# Patient Record
Sex: MALE | Race: WHITE
[De-identification: names, ages, dates, MRNs, and addresses within clinical notes are randomized per-mention and may not be internally consistent; named-entity substitution may affect disease eponyms.]

---

## 2017-12-19 ENCOUNTER — HOSPITAL ENCOUNTER (OUTPATIENT)
Dept: HOSPITAL 92 - LABBT | Age: 62
Discharge: HOME | End: 2017-12-19
Attending: NEUROLOGICAL SURGERY
Payer: COMMERCIAL

## 2017-12-19 DIAGNOSIS — Z01.812: Primary | ICD-10-CM

## 2017-12-19 DIAGNOSIS — M47.12: ICD-10-CM

## 2017-12-19 LAB
APTT PPP: 31 SEC (ref 22.9–36.1)
HCT VFR BLD CALC: 51.8 % (ref 42–52)
PROTHROMBIN TIME: 13.9 SEC (ref 12–14.7)
RBC # BLD AUTO: 5.4 MILL/UL (ref 4.7–6.1)
WBC # BLD AUTO: 7.9 THOU/UL (ref 4.8–10.8)

## 2017-12-19 PROCEDURE — 85610 PROTHROMBIN TIME: CPT

## 2017-12-19 PROCEDURE — 85730 THROMBOPLASTIN TIME PARTIAL: CPT

## 2017-12-19 PROCEDURE — 85027 COMPLETE CBC AUTOMATED: CPT

## 2017-12-27 ENCOUNTER — HOSPITAL ENCOUNTER (OUTPATIENT)
Dept: HOSPITAL 92 - ERS | Age: 62
Setting detail: OBSERVATION
LOS: 4 days | Discharge: HOME | End: 2017-12-31
Attending: INTERNAL MEDICINE | Admitting: INTERNAL MEDICINE
Payer: COMMERCIAL

## 2017-12-27 VITALS — BODY MASS INDEX: 24.7 KG/M2

## 2017-12-27 DIAGNOSIS — Z79.899: ICD-10-CM

## 2017-12-27 DIAGNOSIS — I49.9: ICD-10-CM

## 2017-12-27 DIAGNOSIS — I82.4Z2: Primary | ICD-10-CM

## 2017-12-27 LAB
ALBUMIN SERPL BCG-MCNC: 4.5 G/DL (ref 3.4–4.8)
ALP SERPL-CCNC: 39 U/L (ref 40–150)
ALT SERPL W P-5'-P-CCNC: 24 U/L (ref 8–55)
ANION GAP SERPL CALC-SCNC: 14 MMOL/L (ref 10–20)
APTT PPP: 32.3 SEC (ref 22.9–36.1)
AST SERPL-CCNC: 24 U/L (ref 5–34)
BASOPHILS # BLD AUTO: 0.1 THOU/UL (ref 0–0.2)
BASOPHILS NFR BLD AUTO: 0.5 % (ref 0–1)
BILIRUB SERPL-MCNC: 0.6 MG/DL (ref 0.2–1.2)
BUN SERPL-MCNC: 19 MG/DL (ref 8.4–25.7)
CALCIUM SERPL-MCNC: 10.6 MG/DL (ref 7.8–10.44)
CHLORIDE SERPL-SCNC: 98 MMOL/L (ref 98–107)
CK MB SERPL-MCNC: 3.8 NG/ML (ref 0–6.6)
CO2 SERPL-SCNC: 27 MMOL/L (ref 23–31)
CREAT CL PREDICTED SERPL C-G-VRATE: 0 ML/MIN (ref 70–130)
EOSINOPHIL # BLD AUTO: 0.2 THOU/UL (ref 0–0.7)
EOSINOPHIL NFR BLD AUTO: 1.5 % (ref 0–10)
GLOBULIN SER CALC-MCNC: 3.6 G/DL (ref 2.4–3.5)
GLUCOSE SERPL-MCNC: 98 MG/DL (ref 80–115)
HGB BLD-MCNC: 16 G/DL (ref 14–18)
INR PPP: 1.1
LYMPHOCYTES # BLD: 1.3 THOU/UL (ref 1.2–3.4)
LYMPHOCYTES NFR BLD AUTO: 9.5 % (ref 21–51)
MCH RBC QN AUTO: 31.8 PG (ref 27–31)
MCV RBC AUTO: 96.5 FL (ref 80–94)
MONOCYTES # BLD AUTO: 1.1 THOU/UL (ref 0.11–0.59)
MONOCYTES NFR BLD AUTO: 7.7 % (ref 0–10)
NEUTROPHILS # BLD AUTO: 11.1 THOU/UL (ref 1.4–6.5)
NEUTROPHILS NFR BLD AUTO: 80.8 % (ref 42–75)
PLATELET # BLD AUTO: 256 THOU/UL (ref 130–400)
POTASSIUM SERPL-SCNC: 4.6 MMOL/L (ref 3.5–5.1)
PROTHROMBIN TIME: 14.8 SEC (ref 12–14.7)
RBC # BLD AUTO: 5.02 MILL/UL (ref 4.7–6.1)
SODIUM SERPL-SCNC: 134 MMOL/L (ref 136–145)
TROPONIN I SERPL DL<=0.01 NG/ML-MCNC: 0.02 NG/ML (ref ?–0.03)
WBC # BLD AUTO: 13.8 THOU/UL (ref 4.8–10.8)

## 2017-12-27 PROCEDURE — 96375 TX/PRO/DX INJ NEW DRUG ADDON: CPT

## 2017-12-27 PROCEDURE — 81240 F2 GENE: CPT

## 2017-12-27 PROCEDURE — 85303 CLOT INHIBIT PROT C ACTIVITY: CPT

## 2017-12-27 PROCEDURE — 85018 HEMOGLOBIN: CPT

## 2017-12-27 PROCEDURE — 85305 CLOT INHIBIT PROT S TOTAL: CPT

## 2017-12-27 PROCEDURE — 85300 ANTITHROMBIN III ACTIVITY: CPT

## 2017-12-27 PROCEDURE — 93005 ELECTROCARDIOGRAM TRACING: CPT

## 2017-12-27 PROCEDURE — G0009 ADMIN PNEUMOCOCCAL VACCINE: HCPCS

## 2017-12-27 PROCEDURE — 85240 CLOT FACTOR VIII AHG 1 STAGE: CPT

## 2017-12-27 PROCEDURE — 96372 THER/PROPH/DIAG INJ SC/IM: CPT

## 2017-12-27 PROCEDURE — G0378 HOSPITAL OBSERVATION PER HR: HCPCS

## 2017-12-27 PROCEDURE — 82565 ASSAY OF CREATININE: CPT

## 2017-12-27 PROCEDURE — 71010: CPT

## 2017-12-27 PROCEDURE — 85598 HEXAGNAL PHOSPH PLTLT NEUTRL: CPT

## 2017-12-27 PROCEDURE — 96376 TX/PRO/DX INJ SAME DRUG ADON: CPT

## 2017-12-27 PROCEDURE — 90732 PPSV23 VACC 2 YRS+ SUBQ/IM: CPT

## 2017-12-27 PROCEDURE — 85730 THROMBOPLASTIN TIME PARTIAL: CPT

## 2017-12-27 PROCEDURE — 80053 COMPREHEN METABOLIC PANEL: CPT

## 2017-12-27 PROCEDURE — 71275 CT ANGIOGRAPHY CHEST: CPT

## 2017-12-27 PROCEDURE — 90471 IMMUNIZATION ADMIN: CPT

## 2017-12-27 PROCEDURE — 96361 HYDRATE IV INFUSION ADD-ON: CPT

## 2017-12-27 PROCEDURE — 85379 FIBRIN DEGRADATION QUANT: CPT

## 2017-12-27 PROCEDURE — 85610 PROTHROMBIN TIME: CPT

## 2017-12-27 PROCEDURE — 82553 CREATINE MB FRACTION: CPT

## 2017-12-27 PROCEDURE — 96374 THER/PROPH/DIAG INJ IV PUSH: CPT

## 2017-12-27 PROCEDURE — 85014 HEMATOCRIT: CPT

## 2017-12-27 PROCEDURE — 85025 COMPLETE CBC W/AUTO DIFF WBC: CPT

## 2017-12-27 PROCEDURE — 85049 AUTOMATED PLATELET COUNT: CPT

## 2017-12-27 PROCEDURE — 84484 ASSAY OF TROPONIN QUANT: CPT

## 2017-12-27 PROCEDURE — 85307 ASSAY ACTIVATED PROTEIN C: CPT

## 2017-12-27 PROCEDURE — 83090 ASSAY OF HOMOCYSTEINE: CPT

## 2017-12-27 PROCEDURE — 36415 COLL VENOUS BLD VENIPUNCTURE: CPT

## 2017-12-27 RX ADMIN — HYDROCODONE BITARTRATE AND ACETAMINOPHEN PRN TAB: 5; 325 TABLET ORAL at 20:18

## 2017-12-27 NOTE — CT
CT ARTERIOGRAM CHEST WITH IV CONTRAST AND 3D MIP IMAGIN17

 

HISTORY: 

Dyspnea. Chest pain.

 

FINDINGS:  

There is good contrast opacification of the pulmonary arteries and thoracic aorta with normal branchi
ng of the great vessels. Very mild arterial calcifications apparent. There is no  pleural fluid, pneu
mothorax, or mediastinal adenopathy apparent. 

 

IMPRESSION:  

No CT evidence of pulmonary embolus. 

 

POS: [facility]

## 2017-12-27 NOTE — ULT
EXAM:

LEFT LOWER EXTREMITY VENOUS ULTRASOUND WITH DOPPLER

12/27/17

 

HISTORY: 

Left leg swelling and pain. 

 

COMPARISON:  

None.

 

TECHNIQUE:  

Gray scale, color flow, doppler imaging with spectral waveform analysis performed in the left lower e
xtremity venous system.

 

FINDINGS:  

There is lack of complete compressibility and inadequate flow in the common femoral vein, femoral vei
n, and popliteal vein. There is echogenic material and decreased flow in the profunda femoral vein. T
here appears to be significantly diminished flow in the greater saphenous vein. There is flow in the 
posterior tibial vein. 

 

IMPRESSION:  

Left lower extremity deep vein thrombus. DVT is noted from the common femoral vein to the popliteal v
ein. 

 

POS: [facility]

## 2017-12-27 NOTE — RAD
PORTABLE CHEST:

12/27/17

 

HISTORY: 

Dyspnea. 

 

Heart size and mediastinum are within normal limits. The lungs are clear of infiltrates. There are no
 significant bony findings. 

 

IMPRESSION:  

No active intrathoracic disease. 

 

 

 

POS: [facility]

## 2017-12-28 LAB
APTT PPP: 44 SEC (ref 22.9–36.1)
AT III ACT/NOR PPP CHRO: 93 % ACTIVE (ref 85–130)
D DIMER PPP FEU-MCNC: 1.79 *MCG/ML (ref 0.27–0.43)
FACT VIII ACT/NOR PPP: 291 % ACTIVE (ref 56–157)
INR PPP: 1.2
PROTHROMBIN TIME: 15.4 SEC (ref 12–14.7)

## 2017-12-28 RX ADMIN — HYDROCODONE BITARTRATE AND ACETAMINOPHEN PRN TAB: 5; 325 TABLET ORAL at 11:35

## 2017-12-28 RX ADMIN — HYDROCODONE BITARTRATE AND ACETAMINOPHEN PRN TAB: 5; 325 TABLET ORAL at 19:56

## 2017-12-28 RX ADMIN — HYDROCODONE BITARTRATE AND ACETAMINOPHEN PRN TAB: 5; 325 TABLET ORAL at 07:11

## 2017-12-28 RX ADMIN — HYDROCODONE BITARTRATE AND ACETAMINOPHEN PRN TAB: 5; 325 TABLET ORAL at 23:56

## 2017-12-28 RX ADMIN — HYDROCODONE BITARTRATE AND ACETAMINOPHEN PRN TAB: 5; 325 TABLET ORAL at 15:42

## 2017-12-28 NOTE — PDOC.PN
- Subjective


Encounter Start Date: 12/28/17


Encounter Start Time: 13:00


Subjective: pt seen and examined for LEFT leg DVT


-: pt denies any CP, swelling left leg decresed 





- Objective


MAR Reviewed: Yes


Vital Signs & Weight: 


 Vital Signs (12 hours)











  Temp Pulse Resp BP Pulse Ox


 


 12/28/17 11:37  98.2 F  77  18  134/73  95


 


 12/28/17 07:40  98.5 F  68  16  


 


 12/28/17 07:27  98.8 F  77  18  119/57 L  97








 Weight











Weight                         192 lb 9.6 oz














I&O: 


 











 12/27/17 12/28/17 12/29/17





 06:59 06:59 06:59


 


Intake Total  1292 558


 


Output Total  500 600


 


Balance  792 -42











Result Diagrams: 


 12/27/17 15:17





 12/27/17 15:17


Radiology Reviewed by me: Yes





Phys Exam





- Physical Examination


HEENT: PERRLA, moist MMs, sclera anicteric, TM's clear, oral pharynx no lesions

, 2+ tonsils


Neck: no nodes, no JVD, supple, full ROM


Respiratory: no wheezing, no rales, no rhonchi, wheezing present, clear to 

auscultation bilateral


Cardiovascular: RRR, no significant murmur, no rub, gallop, irregular


Gastrointestinal: soft, non-tender, no distention, positive bowel sounds


Musculoskeletal: edema present


Left Leg is swollen up to thigh with erythema





Dx/Plan


(1) Dvt femoral (deep venous thrombosis)


Code(s): I82.419 - ACUTE EMBOLISM AND THROMBOSIS OF UNSPECIFIED FEMORAL VEIN   

Status: Acute   





(2) HTN (hypertension), benign


Code(s): I10 - ESSENTIAL (PRIMARY) HYPERTENSION   Status: Chronic   





(3) Arrhythmia


Code(s): I49.9 - CARDIAC ARRHYTHMIA, UNSPECIFIED   Status: Chronic   





- Plan


cont current plan of care, DVT proph w/lovenox


1) Continue Lovenox 1mg/kg q 12hrs, will add Coumadin tonight 


-: 2) Hypercoaguable work up sent


-: 3) Continue metoprolol for HTN


-: 4) DVT treatment is Lovenox


-: 5) H/o Arrthymia continue Multaq





* .








Review of Systems





- Review of Systems


Respiratory: negative: Cough, Dry, Shortness of Breath, Hemoptysis, SOB with 

Excertion, Pleuritic Pain, Sputum, Wheezing


Cardiovascular: negative: chest pain, palpitations, orthopnea, paroxysmal 

nocturnal dyspnea, edema, light headedness, other


Gastrointestinal: negative: Nausea, Vomiting, Abdominal Pain, Diarrhea, 

Constipation, Melena, Hematochezia, Other


Genitourinary: negative: Dysuria, Frequency, Incontinence, Hematuria, Retention

, Other


Musculoskeletal: Leg Pain





- Medications/Allergies


Allergies/Adverse Reactions: 


 Allergies











Allergy/AdvReac Type Severity Reaction Status Date / Time


 


albuterol Allergy   Verified 12/27/17 19:12


 


meperidine [From Demerol] Allergy   Verified 12/27/17 19:12











Medications: 


 Current Medications





Hydrocodone Bitart/Acetaminophen (Norco 5/325)  1 tab PO Q4H PRN


   PRN Reason: PAIN SCALE 1-5


Hydrocodone Bitart/Acetaminophen (Norco 5/325)  2 tab PO Q4H PRN


   PRN Reason: PAIN SCALE 6-10


   Last Admin: 12/28/17 11:35 Dose:  2 tab


Dronedarone (Multaq)  400 mg PO BID [facility]


   Last Admin: 12/28/17 08:41 Dose:  400 mg


Dronedarone (Multaq)  400 mg PO BID-[facility]


Enoxaparin Sodium (Lovenox)  90 mg SC 0400,1600 [facility]


   Last Admin: 12/28/17 04:11 Dose:  90 mg


Sodium Chloride (Normal Saline 0.9%)  1,000 mls @ 75 mls/hr IV .[id number] [facility]


   Last Admin: 12/28/17 09:39 Dose:  1,000 mls


Loratadine (Claritin)  10 mg PO HS [facility]


Metoprolol Succinate (Toprol Xl)  25 mg PO QPM [facility]


Metoprolol Tartrate (Lopressor)  25 mg PO QPM [facility]


Morphine Sulfate (Morphine)  4 mg SLOW IVP Q4H PRN


   PRN Reason: Pain


   Last Admin: 12/28/17 02:44 Dose:  4 mg


Sertraline HCl (Zoloft)  50 mg PO DAILY [facility]


   Last Admin: 12/28/17 08:41 Dose:  50 mg


Sertraline HCl (Zoloft)  50 mg PO DAILY [facility]


Sodium Chloride (Flush - Normal Saline)  10 ml IVF PRN PRN


   PRN Reason: Saline Flush


Trazodone HCl (Desyrel)  100 mg PO HS [facility]


Trazodone HCl (Desyrel)  100 mg PO HS [facility]

## 2017-12-28 NOTE — HP
CHIEF COMPLAINT:  Pain in the left leg.

 

HISTORY OF PRESENT ILLNESS:  He is a 62-year-old man with no past medical history.  He has presented 
to the hospital with redness, swelling of left leg with pain and tenderness for the last 2 days.  He 
has been less mobile for the last one week because he is going for fusion back surgery.  He denies an
y fever, any injury, any chest pain, and any short of breath.  In the ED, he was given morphine and Z
ofran for the pain.  When he comes in to the ER, pulse 83, blood pressure 140/59, temperature 98.3, r
espiration rate 18.

 

CURRENT MEDICATIONS:  He takes in home, Multaq 400 mg 2 times daily for arrhythmias, Demerol 50 mg ev
richelle 6 hours, Zoloft 100 mg daily, meloxicam 15 mg daily, trazodone 100 mg daily, metoprolol 20 mg shantelle
ly, Depo-Testosterone 200 mg intramuscular once a week.

 

PERSONAL HISTORY:  Does not smoke, does not drink.  Denied any drug use.

 

PAST MEDICAL HISTORY:  No history of DVT in the past.

 

PAST SURGICAL HISTORY:  No surgical history.

 

REVIEW OF SYSTEMS:  Constitutional:  He denies any fever, any malaise, and any weight loss.  HEENT:  
He denies any ear pain, earache and eye discharge.  Respiration:  He denies any chest pain, any cough
, any of short of breath, and any wheezing.  Cardiovascular:  Denies arrhythmias, any palpitation, an
y chest pain, and any orthopnea.  Abdomen:  Soft.  Denies any nausea, vomiting, diarrhea.  Back:  Den
ies any musculoskeletal pain.  Extremity:  He has pain in the left leg with swelling.  Skin:  No rash
 noted.

 

PHYSICAL EXAMINATION:

GENERAL:  When examined him, he is a middle-aged man, not in distress.

VITAL SIGNS:  Pulse 80, blood pressure 130/65, respiration rate 18, temperature 98.4, saturating 96% 
on room air.

HEENT:  Head is atraumatic, normocephalic.  Pupils are round, reactive.  External ocular muscles inta
ct.  Ears, nose, and throat normal.  Tongue mucosa is moist.

NECK:  Supple, no JVD, no thyromegaly.

LUNGS:  Chest has normal vesicular breathing, no added sound.  No chest wall tenderness noted.

CARDIOVASCULAR:  S1, S2 audible.  No S3, no S4.

ABDOMEN:  Soft.  Bowel sounds audible, no organomegaly, no tenderness.

EXTREMITIES:  No pedal edema.  Swelling of the left leg up to the thigh.  Mild edema noted similar ti
mes and no further focal deficit.

 

LABORATORY DATA:  WBC 30.8, hemoglobin 16.0, hematocrit 48.3, platelets 226.  INR 1.0, PTT 32.2.  Dop
pler scan shows proximal left lower extremity DVT.

 

ASSESSMENT AND PLAN:

1.  Acute deep venous thrombosis left lower extremity secondary to immobilization, currently on Loven
ox 90 mg q.12 hours.  Continue pain management.  Elevate the leg.

2.  History of arrhythmias.  Continue metoprolol and Multaq.

3.  Deep venous thrombosis, treatment is Lovenox.  Discussed with the patient of the option about the
 Coumadin and Xarelto for the long-term deep venous thrombosis management.  Risk and benefit of the C
oumadin and Xarelto has explained.  The Coumadin has an antidote so we can reverse the bleeding fact 
but for the Xarelto, there is no antidote so we will discuss tomorrow more about this management, con
tinue current management.

## 2017-12-29 LAB
APTT PPP: 60.1 SEC (ref 22.9–36.1)
CREAT CL PREDICTED SERPL C-G-VRATE: 114 ML/MIN (ref 70–130)
HGB BLD-MCNC: 14 G/DL (ref 14–18)
INR PPP: 1.2
INR PPP: 1.4
PLATELET # BLD AUTO: 245 THOU/UL (ref 130–400)
PROTHROMBIN TIME: 15.8 SEC (ref 12–14.7)
PROTHROMBIN TIME: 17.8 SEC (ref 12–14.7)

## 2017-12-29 RX ADMIN — HYDROCODONE BITARTRATE AND ACETAMINOPHEN PRN TAB: 5; 325 TABLET ORAL at 06:08

## 2017-12-29 RX ADMIN — HYDROCODONE BITARTRATE AND ACETAMINOPHEN PRN TAB: 5; 325 TABLET ORAL at 14:31

## 2017-12-29 RX ADMIN — HYDROCODONE BITARTRATE AND ACETAMINOPHEN PRN TAB: 5; 325 TABLET ORAL at 18:42

## 2017-12-29 RX ADMIN — HYDROCODONE BITARTRATE AND ACETAMINOPHEN PRN TAB: 5; 325 TABLET ORAL at 23:22

## 2017-12-29 RX ADMIN — HYDROCODONE BITARTRATE AND ACETAMINOPHEN PRN TAB: 5; 325 TABLET ORAL at 10:26

## 2017-12-30 LAB
APTT PPP: 72.8 SEC (ref 22.9–36.1)
INR PPP: 2.3
PROTHROMBIN TIME: 26.3 SEC (ref 12–14.7)

## 2017-12-30 RX ADMIN — HYDROCODONE BITARTRATE AND ACETAMINOPHEN PRN TAB: 5; 325 TABLET ORAL at 13:54

## 2017-12-30 RX ADMIN — HYDROCODONE BITARTRATE AND ACETAMINOPHEN PRN TAB: 5; 325 TABLET ORAL at 22:05

## 2017-12-30 RX ADMIN — HYDROCODONE BITARTRATE AND ACETAMINOPHEN PRN TAB: 5; 325 TABLET ORAL at 09:53

## 2017-12-30 RX ADMIN — HYDROCODONE BITARTRATE AND ACETAMINOPHEN PRN TAB: 5; 325 TABLET ORAL at 18:15

## 2017-12-30 RX ADMIN — HYDROCODONE BITARTRATE AND ACETAMINOPHEN PRN TAB: 5; 325 TABLET ORAL at 04:16

## 2017-12-30 NOTE — PDOC.PN
- Subjective


Encounter Start Date: 12/30/17


Encounter Start Time: 08:00


Subjective: Pain in LLE better, still a little swollen. No CP/SOB. No PCP or 

insurance.


-: Lives near [place] and was going to establish at their clinic.





- Objective


MAR Reviewed: Yes


Vital Signs & Weight: 


 Vital Signs (12 hours)











  Temp Pulse Resp BP Pulse Ox


 


 12/30/17 04:15  98.2 F  73  14  121/69  97


 


 12/29/17 23:20   68  14  103/58 L  94 L








 Weight











Weight                         192 lb 9.6 oz














I&O: 


 











 12/29/17 12/30/17 12/31/17





 06:59 06:59 06:59


 


Intake Total 2449 2732 


 


Output Total 1900 2000 


 


Balance 549 732 











Result Diagrams: 


 12/29/17 13:10





 12/29/17 13:10





Phys Exam





- Physical Examination


Constitutional: NAD


HEENT: moist MMs


Respiratory: no wheezing, no rales, no rhonchi, clear to auscultation bilateral


Cardiovascular: RRR, no significant murmur


Gastrointestinal: soft, positive bowel sounds


distended peripheral veins in left leg, no pitting edema


Neurological: non-focal, moves all 4 limbs


Psychiatric: normal affect, A&O x 3





Dx/Plan


(1) Dvt femoral (deep venous thrombosis)


Code(s): I82.419 - ACUTE EMBOLISM AND THROMBOSIS OF UNSPECIFIED FEMORAL VEIN   

Status: Acute   


Qualifiers: 


   Chronicity: acute   Laterality: left   Qualified Code(s): I82.412 - Acute 

embolism and thrombosis of left femoral vein   


Comment: INR therapeutic today. Will d/c Lovenox. Arrange outpatient followup 

and discharge home.   





(2) Arrhythmia


Code(s): I49.9 - CARDIAC ARRHYTHMIA, UNSPECIFIED   Status: Chronic   





(3) HTN (hypertension), benign


Code(s): I10 - ESSENTIAL (PRIMARY) HYPERTENSION   Status: Chronic   





(4) Chronic back pain


Code(s): M54.9 - DORSALGIA, UNSPECIFIED; G89.29 - OTHER CHRONIC PAIN   Status: 

Chronic   





- Plan


cont current plan of care, out of bed/ambulate


Only 2 days of Warfarin and already therapeutic, will decrease dose to 5mg


-: daily.


-: Needs Coumadin f/u in 2 days after D/C. No PCP and Monday is holiday. May 


-: need to keep at least till tomorrow and have f/u o/p on Tuesday if can 


-: arrange.





* .








- Discharge Day


Encounter end time: 08:30

## 2017-12-31 VITALS — TEMPERATURE: 99.6 F | SYSTOLIC BLOOD PRESSURE: 149 MMHG | DIASTOLIC BLOOD PRESSURE: 74 MMHG

## 2017-12-31 LAB
APTT PPP: 56.8 SEC (ref 22.9–36.1)
INR PPP: 2.9
PROTHROMBIN TIME: 31.8 SEC (ref 12–14.7)

## 2017-12-31 RX ADMIN — HYDROCODONE BITARTRATE AND ACETAMINOPHEN PRN TAB: 5; 325 TABLET ORAL at 07:40

## 2017-12-31 RX ADMIN — HYDROCODONE BITARTRATE AND ACETAMINOPHEN PRN TAB: 5; 325 TABLET ORAL at 02:31

## 2018-01-04 LAB — APTT HEX PL PPP: 25 SEC

## 2018-09-20 ENCOUNTER — HOSPITAL ENCOUNTER (OUTPATIENT)
Dept: HOSPITAL 92 - RAD-FRANK | Age: 63
Discharge: HOME | End: 2018-09-20
Attending: NURSE PRACTITIONER
Payer: COMMERCIAL

## 2018-09-20 DIAGNOSIS — R06.02: Primary | ICD-10-CM

## 2018-09-20 PROCEDURE — 71046 X-RAY EXAM CHEST 2 VIEWS: CPT

## 2018-09-20 NOTE — RAD
CHEST 2 VIEWS:

 

Date:  09/20/18 

 

HISTORY:  

Shortness of breath. 

 

FINDINGS:

Heart size and mediastinum are within normal limits. Lungs are clear of infiltrates. There are arthri
tic changes of the spine. 

 

IMPRESSION: 

No active intrathoracic disease. 

 

 

POS: [facility]

## 2019-11-07 ENCOUNTER — HOSPITAL ENCOUNTER (OUTPATIENT)
Dept: HOSPITAL 92 - RAD-FRANK | Age: 64
Discharge: HOME | End: 2019-11-07
Attending: NURSE PRACTITIONER
Payer: MEDICARE

## 2019-11-07 DIAGNOSIS — J40: Primary | ICD-10-CM

## 2019-11-07 PROCEDURE — 71046 X-RAY EXAM CHEST 2 VIEWS: CPT

## 2019-11-07 NOTE — RAD
EXAM:

Two views chest



PROVIDED CLINICAL HISTORY:

Cough



COMPARISON:

9/20/2018



FINDINGS:

Cardiac silhouette and pulmonary vasculature are within normal limits.  The inferior aspect right lat
eral costophrenic angle is excluded from view. The lungs are otherwise clear. Degenerative changes

are again seen in the spine.



IMPRESSION:

No acute cardiopulmonary process.



Reported By: Sriram Pavon 

Electronically Signed:  11/7/2019 11:41 AM

## 2023-01-10 ENCOUNTER — HOSPITAL ENCOUNTER (OUTPATIENT)
Dept: HOSPITAL 92 - RAD-FRANK | Age: 68
Discharge: HOME | End: 2023-01-10
Attending: NURSE PRACTITIONER
Payer: MEDICARE

## 2023-01-10 DIAGNOSIS — R05.9: Primary | ICD-10-CM

## 2023-01-10 PROCEDURE — 71046 X-RAY EXAM CHEST 2 VIEWS: CPT
